# Patient Record
Sex: MALE | Race: WHITE | NOT HISPANIC OR LATINO | URBAN - METROPOLITAN AREA
[De-identification: names, ages, dates, MRNs, and addresses within clinical notes are randomized per-mention and may not be internally consistent; named-entity substitution may affect disease eponyms.]

---

## 2018-02-26 ENCOUNTER — OFFICE VISIT CONVERTED (OUTPATIENT)
Dept: PULMONOLOGY | Facility: CLINIC | Age: 83
End: 2018-02-26
Attending: INTERNAL MEDICINE

## 2018-09-28 ENCOUNTER — LAB REQUISITION (OUTPATIENT)
Dept: LAB | Facility: HOSPITAL | Age: 83
End: 2018-09-28

## 2018-09-28 DIAGNOSIS — Z00.00 ROUTINE GENERAL MEDICAL EXAMINATION AT A HEALTH CARE FACILITY: ICD-10-CM

## 2018-09-28 LAB
ANION GAP SERPL CALCULATED.3IONS-SCNC: 10.1 MMOL/L
BASOPHILS # BLD AUTO: 0.03 10*3/MM3 (ref 0–0.2)
BASOPHILS NFR BLD AUTO: 0.5 % (ref 0–1.5)
BUN BLD-MCNC: 26 MG/DL (ref 8–23)
BUN/CREAT SERPL: 23.2 (ref 7–25)
CALCIUM SPEC-SCNC: 9.1 MG/DL (ref 8.6–10.5)
CHLORIDE SERPL-SCNC: 104 MMOL/L (ref 98–107)
CO2 SERPL-SCNC: 27.9 MMOL/L (ref 22–29)
CREAT BLD-MCNC: 1.12 MG/DL (ref 0.76–1.27)
DEPRECATED RDW RBC AUTO: 47.4 FL (ref 37–54)
EOSINOPHIL # BLD AUTO: 0.34 10*3/MM3 (ref 0–0.7)
EOSINOPHIL NFR BLD AUTO: 5.1 % (ref 0.3–6.2)
ERYTHROCYTE [DISTWIDTH] IN BLOOD BY AUTOMATED COUNT: 13.3 % (ref 11.5–14.5)
GFR SERPL CREATININE-BSD FRML MDRD: 62 ML/MIN/1.73
GFR SERPL CREATININE-BSD FRML MDRD: 76 ML/MIN/1.73
GLUCOSE BLD-MCNC: 84 MG/DL (ref 65–99)
HCT VFR BLD AUTO: 36.9 % (ref 40.4–52.2)
HGB BLD-MCNC: 11.6 G/DL (ref 13.7–17.6)
IMM GRANULOCYTES # BLD: 0 10*3/MM3 (ref 0–0.03)
IMM GRANULOCYTES NFR BLD: 0 % (ref 0–0.5)
LYMPHOCYTES # BLD AUTO: 1.62 10*3/MM3 (ref 0.9–4.8)
LYMPHOCYTES NFR BLD AUTO: 24.5 % (ref 19.6–45.3)
MCH RBC QN AUTO: 30.9 PG (ref 27–32.7)
MCHC RBC AUTO-ENTMCNC: 31.4 G/DL (ref 32.6–36.4)
MCV RBC AUTO: 98.1 FL (ref 79.8–96.2)
MONOCYTES # BLD AUTO: 0.77 10*3/MM3 (ref 0.2–1.2)
MONOCYTES NFR BLD AUTO: 11.6 % (ref 5–12)
NEUTROPHILS # BLD AUTO: 3.86 10*3/MM3 (ref 1.9–8.1)
NEUTROPHILS NFR BLD AUTO: 58.3 % (ref 42.7–76)
PLATELET # BLD AUTO: 195 10*3/MM3 (ref 140–500)
PMV BLD AUTO: 11.2 FL (ref 6–12)
POTASSIUM BLD-SCNC: 4.2 MMOL/L (ref 3.5–5.2)
RBC # BLD AUTO: 3.76 10*6/MM3 (ref 4.6–6)
SODIUM BLD-SCNC: 142 MMOL/L (ref 136–145)
WBC NRBC COR # BLD: 6.62 10*3/MM3 (ref 4.5–10.7)

## 2018-09-28 PROCEDURE — 80048 BASIC METABOLIC PNL TOTAL CA: CPT

## 2018-09-28 PROCEDURE — 85025 COMPLETE CBC W/AUTO DIFF WBC: CPT

## 2021-05-28 VITALS
WEIGHT: 139.25 LBS | SYSTOLIC BLOOD PRESSURE: 145 MMHG | RESPIRATION RATE: 12 BRPM | BODY MASS INDEX: 21.1 KG/M2 | HEART RATE: 67 BPM | TEMPERATURE: 97.7 F | DIASTOLIC BLOOD PRESSURE: 80 MMHG | HEIGHT: 68 IN | OXYGEN SATURATION: 97 %

## 2021-05-28 NOTE — PROGRESS NOTES
Patient: KIMBERLY CHAN     Acct: [id number]     Report: #[id number]  UNIT #: [id number]     : 1934    Encounter Date:2018  PRIMARY CARE: Augie Chacon  ***Signed***  --------------------------------------------------------------------------------------------------------------------  Chief Complaint      Encounter Date      2018            Referring Provider      Augie Chacon            Patient Complaint      Patient is complaining of      [facility]            VITALS      Height 5 ft 8 in / 172.72 cm      Weight 139 lbs 4 oz / 63.727173 kg      BSA 1.74 m2      BMI 21.2 kg/m2      Temperature 97.7 F / 36.5 C - Oral      Pulse 67      Respirations 12      Blood Pressure 145/80 Sitting, Left Arm      Pulse Oximetry 97%, [email or web address]            HPI      The patient is a very pleasant 83 year old gentleman who was hospitalization in     January with left upper lobe pneumonia and bronchiectasis. He had recently     undergone bronchoscopy by my partner Dr. Lou on 18 which included     bronchoscopy with bronchoalveolar lavage of left upper lobe, brushings of the     left upper lobe, transbronchial biopsies of the left upper lobe and bronchial     washings of the tracheobronchial tree. The patient presents today for follow     up. His bronchoscopy did not isolate any causative organism, he grew normal     anahy. He had negative acid fast bacillus smears and negative fungal cultures.     The patient was treated with empiric antibiotics and completed a course of     this. His pathology came back without alveolated lung tissue present but there     was noted to be bronchial mucosa and chronic inflammation from the right upper     lung.             Today, the patient states that he is much better. He is no longer having cough,     shortness of breath or wheezing. He is compliant with his home medications     postdischarge from the hospital without any breathing treatments and he does      not feel like he needs these currently. He denies any aspiration into the     airways or difficulty swallowing. He denies night sweats, fevers or chills or     unintentional weight loss.             Review of Systems is completely negative.             PAST MEDICAL HISTORY:      1. History of colon cancer.       2. History of melanoma status post removal.       3. History of gastrointestinal esophageal reflux disease.       4. History of hypertension.       5. History of hernia.       6. Recent hospitalization for pneumonia.       7. History of aortic stenosis status post repair.             PAST SURGICAL HISTORY:      1. Colon resection with history of colonoscopy.       2. Bilateral cataract extraction.       3. Glaucoma surgery.       4. Tonsillectomy.       5. Aortic valve repair.             Medications were reviewed by myself with the patient and updated in the chart.            ROS      Constitutional:  Denies: Fatigue, Fever, Weight gain, Weight loss, Chills,     Insomnia, Other      Respiratory/Breathing:  Denies: Shortness of air, Wheezing, Cough, Hemoptysis,     Pleuritic pain, Other      Endocrine:  Denies: Polydipsia, Polyuria, Heat/cold intolerance, Diabetes, Other      Eyes:  Denies: Blurred vision, Vision Changes, Other      Ears, nose, mouth, throat:  Denies: Mouth lesions, Thrush, Throat pain,     Hoarseness, Allergies/Hay Fever, Post Nasal Drip, Headaches, Recent Head Injury    , Nose Bleeding, Neck Stiffness, Thyroid Mass, Hearing Loss, Ear Fullness, Dry     Mouth, Nasal or Sinus Pain, Dry Lips, Nasal discharge, Nasal congestion, Other      Cardiovascular:  Denies: Palpitations, Syncope, Claudication, Chest Pain, Wake     up Gasping for air, Leg Swelling, Irregular Heart Rate, Cyanosis, Dyspnea on     Exertion, Other      Gastrointestinal:  Denies: Nausea, Constipation, Diarrhea, Abdominal pain,     Vomiting, Difficulty Swallowing, Reflux/Heartburn, Dysphagia, Jaundice, Bloating    , Melena,  Bloody stools, Other      Genitourinary:  Denies: Urinary frequency, Incontinence, Hematuria, Urgency,     Nocturia, Dysuria, Testicular problems, Other      Musculoskeletal:  Denies: Joint Pain, Joint Stiffness, Joint Swelling, Myalgias    , Other      Hematologic/lymphatic:  DENIES: Lymphadenopathy, Bruising, Bleeding tendencies,     Other      Neurological:  Denies: Headache, Numbness, Weakness, Seizures, Other      Psychiatric:  Denies: Anxiety, Appropriate Effect, Depression, Other      Sleep:  No: Excessive daytime sleep, Morning Headache?, Snoring, Insomnia?,     Stop breathing at sleep?, Other      Integumentary:  Denies: Rash, Dry skin, Skin Warm to Touch, Other      Immunologic/Allergic:  Denies: Latex allergy, Seasonal allergies, Asthma,     Urticaria, Eczema, Other      Immunization status:  No: Up to date            FAMILY/SOCIAL/MEDICAL HX      Surgical History:  Yes: Bowel Surgery (COLON RESECTION, COLONOSCOPY), Head     Surgery (RIGHT EYE CATARACT EXTRACTION, GLAUCOMA SURGERY), Oral Surgery (    TONSILLECTOMY), No: Abdominal Surgery, Appendectomy, Bladder Surgery, CABG,     Cholecystectomy, Orthopedic Surgery, Vascular Surgery      Is Father Still Living?:  No      Is Mother Still Living?:  No      Social History:  Tobacco Use      Smoking status:  Never smoker      Anticoagulation Therapy:  No      Antibiotic Prophylaxis:  No      Medical History:  Yes: Chemotherapy/Cancer (COLON, RIGHT ARM MELANOMA REMOVED),     Hemorrhoids/Rectal Prob (ACID REFLUX AND COLON CANCER), High Blood Pressure (    CONTROLLED WITH MEDS), Miscellaneous Medical/oth (HERNIA RIGHT ABDOMEN), No:     Arthritis, Asthma, Blood Disease, Chronic Bronchitis/COPD, Congestive Heart     Failu, Deafness or Ringing Ears, Diabetes, Seizures, Heart Attack, Shortness Of     Breath            Hx Influenza Vaccination:  No      Influenza Vaccine Declined:  No      2 or More Falls Past Year?:  Yes      Fall Past Year with Injury?:  Yes      Hx  Pneumococcal Vaccination:  Yes (FALL 2017)      Encouraged to follow-up with:  PCP regarding preventative exams.      Chart initiated by      yarelis siegel ma            ALLERGIES/MEDICATIONS      Allergies:        Coded Allergies:             PENICILLINS (Verified  Allergy, Unknown, rash, 2/26/18)      Medications    Last Reconciled on 2/26/18 15:05 by YARELIS SIEGEL      Methazolamide (Methazolamide) 50 Mg Tablet      25 MG PO, TAB         Reported         1/21/18       Acetaminophen* (Tylenol*) 325 Mg Tablet      650 MG PO Q4H Y for MILD PAIN (PAIN LEVEL 1-3), #1 TAB         Prov: Ricky Swain         1/20/18       Lisinopril* (Lisinopril*) 2.5 Mg Tablet      2.5 MG PO QDAY, #30 TAB 0 Refills         Reported         1/16/18       Brinzolamide (Azopt 1% Ophth*) 10 Ml Drops.susp      1 DROPS EYE EACH BID, #1 DROPS         Reported         1/16/18       Brimonidine Tartrate/Timolol (COMBIGAN 0.2%-0.5% EYE DROPS) 5 Ml Drops      1 DROPS EYE EACH BID, #1 BOTTLE         Reported         1/16/18       Latanoprost (Xalatan Ophth*) 2.5 Ml Drops      1 DROPS EYE EACH HS, BOTTLE         Reported         1/16/18       Levothyroxine (Synthroid) 0.05 Mg Tablet      50 MCG PO QDAY         Reported         8/18/08      Current Medications      Current Medications Reviewed 2/26/18            EXAM      Vital signs reviewed.       GENERAL:  The patient appears in no acute distress, speaking in full sentences     on room air.       HEENT: Pupils are equally round and reactive to light and accommodation.      Extraocular muscles intact bilateral. Nares patent without hypertrophy of the     turbinates.  TM's are clear bilaterally. Small oropharynx without lesions or     erythema.       NECK:  Supple without tracheal deviation or lymphadenopathy. No thyromegaly     appreciated.       LYMPHATICS: No cervical or supraclavicular lymphadenopathy.       RESPIRATORY: Left upper lobe was diminished but otherwise  no wheezes, rales or     rhonchi,  tympanic to percussion.      CVS:  Regular rate and rhythm, systolic murmur heard throughout all listening     posts, rubs or gallops, no lower extremity edema, equal radial pulses.      GI: Abdomen soft, nontender, nondistended, no hepatomegaly appreciated.  Bowel     sounds present in all 4 quadrants.       MUSCULOSKELETAL: No erythema, warmth or fluctuance over the major joints     including the knees, ankles, wrists and elbows.        SKIN: No rashes or lesions.       NEUROLOGICAL: Alert and oriented X 3.  No focal deficits on exam. Cranial     nerves II-XII intact bilaterally.       PSYCH: Patient has appropriate mood and affect.      Vtials      Vitals:             Height 5 ft 8 in / 172.72 cm           Weight 139 lbs 4 oz / 63.123568 kg           BSA 1.74 m2           BMI 21.2 kg/m2           Temperature 97.7 F / 36.5 C - Oral           Pulse 67           Respirations 12           Blood Pressure 145/80 Sitting, Left Arm           Pulse Oximetry 97%, [email or web address]@rest            REVIEW      Results Reviewed      PCCS Results Reviewed?:  Yes Prev Lab Results, Yes Prev Radiology Results, Yes     Previous [facility] Records      Lab Results      I personally reviewed the patient's microbiology report from his recent     bronchoscopy.      Micro Results      I personally reviewed the patient's echocardiogram performed 01/16/18. Of note,     the patient had severe aortic valve stenosis, tricuspid regurgitation with an     estimated pulmonary artery systolic pressure of 40, moderate mitral     regurgitation and grade 1 diastolic dysfunction.      Radiographic Results      I personally reviewed the patient's imaging of the chest including head CT scan      performed 01/15/18.            PLAN      Assessment      Aortic stenosis - I35.0            Community acquired pneumonia - J18.9            Acute hypoxemic respiratory failure - J96.01            Notes      New Diagnostics      * Chest W/O Cont CT, SCHEDULED PROCEDURE        Dx: Acute hypoxemic respiratory failure - J96.01      New Office Procedures      * Fluzone Vaccine, As Soon As Possible       Flu Vaccine Quadrivalent (Fluzone High Dose Syringe) 180 MCG/0.5 ML SYRINGE:     180 MICROGRAM INTRAMUSCULARLY Qty 1 SYRINGE       Dx: Aortic stenosis - I35.0      * Prevnar 0.5 PCV13, As Soon As Possible       Pneumoc 13-Enid Conj-Dip CRm/Pf (Prevnar 13 Syringe) 0.5 ML SYRINGE: 0.5     MILLILITER INTRAMUSCULARLY Qty 1 SYRINGE       Dx: Aortic stenosis - I35.0      ASSESSMENT:      The patient is a 83 year old gentleman with a recent history of hospitalization     for left upper lobe community acquired pneumonia. He has evidence of     bronchiectasis as well as incidental finding of aortic stenosis, diastolic     congestive heart failure and presents today for follow up.             1. Recent community acquired pneumonia without causative agent identified     status post bronchoscopy by Dr. Lou.       2. Left upper lobe atelectasis and marked volume loss with active     consolidation.       3. Cystic appearing bronchiectasis in the left upper lobe.       4. Infrarenal abdominal aortic aneurysm measuring 3.4 cm.       5. Aortic stenosis, moderate to severe.       6. Diastolic congestive heart failure without acute decompensation.             PLAN:      1. I have ordered a repeat CT scan of the chest in 3 months time to follow up     on resolution of his pneumonia.       2. He has completed antibiotics therapy.       3. I followed up on pathology and unfortunately there is no alveolated lung     tissue so this could be sampling error. We will follow this closely as he does     have a history of smoking. This could be cancer undiagnosed.       4. I gave the patient a Fluzone vaccine today and Prevnar vaccine.       5. Follow up with cardiology in regards to his aortic stenosis.                 Disclaimer: Converted document may not contain table formatting or lab diagrams. Please see [facility] for the authenticated document.